# Patient Record
Sex: FEMALE | Race: BLACK OR AFRICAN AMERICAN | Employment: OTHER | URBAN - METROPOLITAN AREA
[De-identification: names, ages, dates, MRNs, and addresses within clinical notes are randomized per-mention and may not be internally consistent; named-entity substitution may affect disease eponyms.]

---

## 2021-04-29 ENCOUNTER — OFFICE VISIT (OUTPATIENT)
Dept: ENDOCRINOLOGY | Age: 81
End: 2021-04-29
Payer: MEDICARE

## 2021-04-29 VITALS
TEMPERATURE: 97.4 F | OXYGEN SATURATION: 99 % | WEIGHT: 200.2 LBS | DIASTOLIC BLOOD PRESSURE: 63 MMHG | HEIGHT: 65 IN | BODY MASS INDEX: 33.36 KG/M2 | SYSTOLIC BLOOD PRESSURE: 134 MMHG | HEART RATE: 89 BPM

## 2021-04-29 DIAGNOSIS — E04.2 MULTINODULAR GOITER: Primary | ICD-10-CM

## 2021-04-29 DIAGNOSIS — E03.8 SUBCLINICAL HYPOTHYROIDISM: ICD-10-CM

## 2021-04-29 PROBLEM — I10 ESSENTIAL HYPERTENSION: Status: ACTIVE | Noted: 2021-04-29

## 2021-04-29 PROBLEM — E11.65 TYPE 2 DIABETES MELLITUS WITH HYPERGLYCEMIA, WITHOUT LONG-TERM CURRENT USE OF INSULIN (HCC): Status: ACTIVE | Noted: 2021-04-29

## 2021-04-29 PROBLEM — I25.10 CORONARY ARTERY DISEASE INVOLVING NATIVE CORONARY ARTERY OF NATIVE HEART WITHOUT ANGINA PECTORIS: Status: ACTIVE | Noted: 2021-04-29

## 2021-04-29 PROCEDURE — 1090F PRES/ABSN URINE INCON ASSESS: CPT | Performed by: INTERNAL MEDICINE

## 2021-04-29 PROCEDURE — G8752 SYS BP LESS 140: HCPCS | Performed by: INTERNAL MEDICINE

## 2021-04-29 PROCEDURE — G8754 DIAS BP LESS 90: HCPCS | Performed by: INTERNAL MEDICINE

## 2021-04-29 PROCEDURE — G8400 PT W/DXA NO RESULTS DOC: HCPCS | Performed by: INTERNAL MEDICINE

## 2021-04-29 PROCEDURE — 99204 OFFICE O/P NEW MOD 45 MIN: CPT | Performed by: INTERNAL MEDICINE

## 2021-04-29 PROCEDURE — G8536 NO DOC ELDER MAL SCRN: HCPCS | Performed by: INTERNAL MEDICINE

## 2021-04-29 PROCEDURE — G8510 SCR DEP NEG, NO PLAN REQD: HCPCS | Performed by: INTERNAL MEDICINE

## 2021-04-29 PROCEDURE — G8427 DOCREV CUR MEDS BY ELIG CLIN: HCPCS | Performed by: INTERNAL MEDICINE

## 2021-04-29 PROCEDURE — G8417 CALC BMI ABV UP PARAM F/U: HCPCS | Performed by: INTERNAL MEDICINE

## 2021-04-29 PROCEDURE — 1101F PT FALLS ASSESS-DOCD LE1/YR: CPT | Performed by: INTERNAL MEDICINE

## 2021-04-29 RX ORDER — FLUTICASONE FUROATE AND VILANTEROL TRIFENATATE 100; 25 UG/1; UG/1
POWDER RESPIRATORY (INHALATION)
COMMUNITY
Start: 2021-03-20

## 2021-04-29 RX ORDER — AMLODIPINE BESYLATE 5 MG/1
TABLET ORAL
COMMUNITY
Start: 2021-03-17

## 2021-04-29 RX ORDER — PEN NEEDLE, DIABETIC 31 GX5/16"
NEEDLE, DISPOSABLE MISCELLANEOUS
COMMUNITY
Start: 2021-03-27

## 2021-04-29 RX ORDER — BISMUTH SUBSALICYLATE 262 MG
1 TABLET,CHEWABLE ORAL DAILY
COMMUNITY

## 2021-04-29 RX ORDER — METFORMIN HYDROCHLORIDE 1000 MG/1
TABLET ORAL
COMMUNITY
Start: 2021-03-17

## 2021-04-29 RX ORDER — INSULIN ASPART 100 [IU]/ML
INJECTION, SOLUTION INTRAVENOUS; SUBCUTANEOUS
COMMUNITY
Start: 2021-02-05

## 2021-04-29 RX ORDER — LISINOPRIL 10 MG/1
TABLET ORAL
COMMUNITY
Start: 2021-04-23

## 2021-04-29 RX ORDER — INSULIN DETEMIR 100 [IU]/ML
INJECTION, SOLUTION SUBCUTANEOUS
COMMUNITY
Start: 2021-03-17

## 2021-04-29 RX ORDER — LEVOTHYROXINE SODIUM 25 UG/1
25 TABLET ORAL
Qty: 30 TAB | Refills: 3 | Status: SHIPPED | OUTPATIENT
Start: 2021-04-29 | End: 2021-05-29

## 2021-04-29 NOTE — PROGRESS NOTES
History and Physical    Patient: Ulises Mckeon MRN: [id number]  SSN: [id number]    YOB: 1940  Age: [de-identified] y.o. Sex: female      Subjective:      Ulises Mckeon is a [de-identified] y.o. female female with past medical history of hypertension, hyperlipidemia, coronary artery disease, type 2 diabetes mellitus is sent to me by primary care physician Dr. Marvel Vicente for abnormal thyroid labs and multinodular goiter. Patient had labs in January 2021, for routine purposes. This came back showing TSH was elevated. Following this she had more lab work done and thyroid ultrasound. Thyroid ultrasound came back showing multinodular goiter. So patient is sent here for further evaluation and management. Patient is describing low energy level for the past few years, weight fluctuates from losing couple pounds and then she gains back more, bowel movements are regular, sleep is poor but this is not new, she has some cold intolerance. No family history of thyroid problems. No family history of personal history of autoimmune diseases. She has never been on amiodarone or lithium. Difficulty in swallowing (food/pills getting stuck):  She has difficulty in swallowing on and off, but this is even for liquids, this is not getting progressively worse  Difficulty in breathing: No  Visible swelling in the neck: No  Hoarseness of voice: No  Family history of thyroid cancer: No  Personal history of radiation exposure to head/neck region: No  Family/personal history of other cancers: No  Symptoms of hypo/hyperthyroidism: As above    Past Medical History:   Diagnosis Date    Diabetes ([address]     Hypertension      Past Surgical History:   Procedure Laterality Date    HX APPENDECTOMY      HX BUNIONECTOMY      HX HYSTERECTOMY        Family History   Problem Relation Age of Onset    Diabetes Mother     Dementia Father     Seizures Father      Social History     Tobacco Use    Smoking status: Former Smoker    Smokeless tobacco: Never Used   Substance Use Topics    Alcohol use: Not Currently      Prior to Admission medications    Medication Sig Start Date End Date Taking? Authorizing Provider   amLODIPine (NORVASC) 5 mg tablet TAKE 1 TABLET BY MOUTH ONCE DAILY FOR 90 DAYS 3/17/21  Yes Provider, Historical   Breo Ellipta 100-25 mcg/dose inhaler INHALE 1 PUFF BY MOUTH ONCE DAILY FOR 30 DAYS 3/20/21  Yes Provider, Historical   NovoLOG Flexpen U-100 Insulin 100 unit/mL (3 mL) inpn  2/5/21  Yes Provider, Historical   Levemir FlexTouch U-100 Insuln 100 unit/mL (3 mL) inpn INJECT 32 UNITS SUBCUTANEOUSLY ONCE DAILY AT BEDTIME FOR 30 DAYS 3/17/21  Yes Provider, Historical   lisinopriL (PRINIVIL, ZESTRIL) 10 mg tablet  4/23/21  Yes Provider, Historical   metFORMIN (GLUCOPHAGE) 1,000 mg tablet TAKE 1 TABLET BY MOUTH TWICE DAILY WITH MEALS 3/17/21  Yes Provider, Historical   Droplet Pen Needle 31 gauge x 5/16\" ndle  3/27/21  Yes Provider, Historical   multivitamin (ONE A DAY) tablet Take 1 Tab by mouth daily. Yes Provider, Historical   levothyroxine (SYNTHROID) 25 mcg tablet Take 1 Tab by mouth Daily (before breakfast) for 30 days. 4/29/21 5/29/21 Yes Martin Live MD        Allergies   Allergen Reactions    Shrimp Unknown (comments)     Gout       Review of Systems:  ROS    A comprehensive review of systems was preformed and it is negative except mentioned in HPI    Objective:     Vitals:    04/29/21 1359 04/29/21 1406   BP: (!) 141/66 134/63   Pulse: 94 89   Temp: 97.4 °F (36.3 °C)    TempSrc: Temporal    SpO2: 99%    Weight: 200 lb 3.2 oz (90.8 kg)    Height: 5' 5\" (1.651 m)         Physical Exam:    Physical Exam  Vitals signs and nursing note reviewed. Constitutional:       Appearance: Normal appearance. HENT:      Head: Normocephalic and atraumatic. Eyes:      Extraocular Movements: Extraocular movements intact. Pupils: Pupils are equal, round, and reactive to light. Neck:      Musculoskeletal: Neck supple. Comments: Thyroid normal on palpation  Cardiovascular:      Rate and Rhythm: Normal rate and regular rhythm. Pulmonary:      Effort: Pulmonary effort is normal.      Breath sounds: Normal breath sounds. Abdominal:      General: Bowel sounds are normal.      Palpations: Abdomen is soft. Musculoskeletal: Normal range of motion. General: No swelling. Skin:     General: Skin is warm and dry. Neurological:      General: No focal deficit present. Mental Status: She is alert and oriented to person, place, and time. Labs and Imaging:    Last 3 Recorded Weights in this Encounter    04/29/21 1359   Weight: 200 lb 3.2 oz (90.8 kg)        No results found for: HBA1C, HGBE8, FBS1XYMW, BTB9QQCR, XFP9TKBV     Assessment:     Patient Active Problem List   Diagnosis Code    Subclinical hypothyroidism E03.9    Multinodular goiter E04.2    Essential hypertension I10    Type 2 diabetes mellitus with hyperglycemia, without long-term current use of insulin (HCC) E11.65    Coronary artery disease involving native coronary artery of native heart without angina pectoris I25.10           Plan:     Multinodular goiter:  I reviewed labs and notes from the referring provider's office. Thyroid ultrasound 2-:  Left thyroid lobe 5.3 cm x 1.9 cm x 2.3 cm in size. Multiple heterogeneous solid nodules in left thyroid lobe, largest is 1.4 cm. Complex left isthmus nodule 1.3 cm  Right thyroid lobe is 5.1 x 1.8 x 2.2 cm in size. Multiple right thyroid nodules, largest is 1 cm. Inferior right thyroid lobe has 0.3 cm rim calcification    Patient does not have any risk factors for thyroid cancer. Currently none of these nodules meet criteria for biopsy based on the description. Plan:  Repeat thyroid ultrasound prior to next visit, which will be 6 months from the previous ultrasound.     Subclinical hypothyroidism:  1-:  TSH elevated at 5.810 (0.454.5)    1-:  TSH elevated at 7.44  Free T4 normal at 1.28    Patient is symptomatic with cold intolerance and fatigue. Discussed with patient about observation without treatment versus treatment. Patient would like to try treatment to see if she would feel better. Plan:  Start levothyroxine 25 mcg daily. Discussed with patient the correct way of taking levothyroxine. I will see her back in a few months with labs prior to the visit. Essential hypertension:  Blood pressure well controlled on current medications. Orders Placed This Encounter    US THYROID/PARATHYROID/SOFT TISS     Standing Status:   Future     Standing Expiration Date:   5/29/2022    TSH AND FREE T4     Standing Status:   Future     Standing Expiration Date:   10/29/2021    THYROID PEROXIDASE (TPO) AB     Standing Status:   Future     Standing Expiration Date:   10/29/2021    levothyroxine (SYNTHROID) 25 mcg tablet     Sig: Take 1 Tab by mouth Daily (before breakfast) for 30 days.      Dispense:  30 Tab     Refill:  3        Signed By: Emma Bashir MD     April 29, 2021      Return to clinic August 2021

## 2021-05-17 ENCOUNTER — HOSPITAL ENCOUNTER (OUTPATIENT)
Dept: ULTRASOUND IMAGING | Age: 81
Discharge: HOME | End: 2021-05-17
Attending: INTERNAL MEDICINE
Payer: MEDICARE

## 2021-05-17 DIAGNOSIS — E04.2 MULTINODULAR GOITER: ICD-10-CM

## 2021-05-17 PROCEDURE — 76536 US EXAM OF HEAD AND NECK: CPT

## 2021-05-18 ENCOUNTER — TELEPHONE (OUTPATIENT)
Dept: ENDOCRINOLOGY | Age: 81
End: 2021-05-18

## 2021-05-18 NOTE — TELEPHONE ENCOUNTER
Patient notified    ----- Message from Piter Hall MD sent at 5/18/2021  9:39 AM EDT -----  Please inform patient that thyroid ultrasound is not showing any change since the previous one, there are no suspicious nodules.

## 2021-05-18 NOTE — PROGRESS NOTES
Please inform patient that thyroid ultrasound is not showing any change since the previous one, there are no suspicious nodules.

## 2021-08-27 DIAGNOSIS — E03.8 SUBCLINICAL HYPOTHYROIDISM: ICD-10-CM

## 2021-09-20 DIAGNOSIS — E03.8 SUBCLINICAL HYPOTHYROIDISM: Primary | ICD-10-CM

## 2021-09-20 RX ORDER — LEVOTHYROXINE SODIUM 25 UG/1
25 TABLET ORAL
Qty: 90 TABLET | Refills: 1 | Status: SHIPPED | OUTPATIENT
Start: 2021-09-20 | End: 2021-12-19

## 2022-03-18 PROBLEM — I10 ESSENTIAL HYPERTENSION: Status: ACTIVE | Noted: 2021-04-29

## 2022-03-18 PROBLEM — E11.65 TYPE 2 DIABETES MELLITUS WITH HYPERGLYCEMIA, WITHOUT LONG-TERM CURRENT USE OF INSULIN (HCC): Status: ACTIVE | Noted: 2021-04-29

## 2022-03-18 PROBLEM — E04.2 MULTINODULAR GOITER: Status: ACTIVE | Noted: 2021-04-29

## 2022-03-19 PROBLEM — I25.10 CORONARY ARTERY DISEASE INVOLVING NATIVE CORONARY ARTERY OF NATIVE HEART WITHOUT ANGINA PECTORIS: Status: ACTIVE | Noted: 2021-04-29

## 2022-03-19 PROBLEM — E03.8 SUBCLINICAL HYPOTHYROIDISM: Status: ACTIVE | Noted: 2021-04-29

## 2023-05-23 RX ORDER — LISINOPRIL 10 MG/1
TABLET ORAL
COMMUNITY
Start: 2021-04-23

## 2023-05-23 RX ORDER — INSULIN ASPART 100 [IU]/ML
INJECTION, SOLUTION INTRAVENOUS; SUBCUTANEOUS
COMMUNITY
Start: 2021-02-05

## 2023-05-23 RX ORDER — FLUTICASONE FUROATE AND VILANTEROL 100; 25 UG/1; UG/1
POWDER RESPIRATORY (INHALATION)
COMMUNITY
Start: 2021-03-20

## 2023-05-23 RX ORDER — AMLODIPINE BESYLATE 5 MG/1
TABLET ORAL
COMMUNITY
Start: 2021-03-17